# Patient Record
Sex: MALE | Race: WHITE | NOT HISPANIC OR LATINO | Employment: STUDENT | URBAN - METROPOLITAN AREA
[De-identification: names, ages, dates, MRNs, and addresses within clinical notes are randomized per-mention and may not be internally consistent; named-entity substitution may affect disease eponyms.]

---
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
## 2024-02-21 ENCOUNTER — TELEPHONE (OUTPATIENT)
Dept: FAMILY MEDICINE | Facility: CLINIC | Age: 19
End: 2024-02-21
Payer: COMMERCIAL

## 2024-02-21 NOTE — TELEPHONE ENCOUNTER
Called pt LM to Reschedule pt appointment due to Kassi SAM will be out on February 27 th. Please reschedule appointment if patient calls back.- Heena COCHRAN

## 2024-03-04 ENCOUNTER — TELEPHONE (OUTPATIENT)
Dept: FAMILY MEDICINE | Facility: CLINIC | Age: 19
End: 2024-03-04
Payer: COMMERCIAL

## 2024-03-04 NOTE — TELEPHONE ENCOUNTER
Attempted to reach pt , cell # is not the correct # . The person that answers says we have wrong # . Attempted to reach pt on hm # , it is not a working # .--lp

## 2024-03-04 NOTE — TELEPHONE ENCOUNTER
----- Message from Rochelle Renteria sent at 3/4/2024 12:55 PM CST -----  Contact: pt  Type: Needs Medical Advice  Who Called:  pt  Best Call Back Number: [phone]    Additional Information: Pt is calling the office regarding script for skin condition, pt is requesting a refill because it is coming back.Please call back and advise.

## 2024-03-13 ENCOUNTER — TELEPHONE (OUTPATIENT)
Dept: FAMILY MEDICINE | Facility: CLINIC | Age: 19
End: 2024-03-13
Payer: COMMERCIAL

## 2024-03-13 DIAGNOSIS — B36.0 TINEA VERSICOLOR: ICD-10-CM

## 2024-03-13 RX ORDER — KETOCONAZOLE 20 MG/ML
SHAMPOO, SUSPENSION TOPICAL
Qty: 120 ML | Refills: 3 | Status: CANCELLED | OUTPATIENT
Start: 2024-03-13

## 2024-03-13 RX ORDER — FLUCONAZOLE 150 MG/1
150 TABLET ORAL DAILY
Qty: 1 TABLET | Refills: 0 | Status: CANCELLED | OUTPATIENT
Start: 2024-03-13 | End: 2024-03-14

## 2024-03-13 NOTE — TELEPHONE ENCOUNTER
----- Message from Christine Pitt sent at 3/12/2024  3:29 PM CDT -----  Regarding: RX Refill Request  Contact: patient at [phone]  Type:  RX Refill Request    Who Called:  patient at [phone] (information updated, incorrect phone prior)    Preferred Pharmacy with phone number:    [id number] - [place] - [address]  [address]  [place] [address]  Phone: [phone] Fax: [phone]      Additional Information:  patient would like a refill of the medication below and the pills that were given to him, but not prescribed for the fungal area. If an appointment is need please provide as well. Please call and advise. Thank you      ketoconazole (NIZORAL) 2 % shampoo 120 mL 3 2/16/2023 -   Sig - Route: Apply topically 3 (three) times a week. - Topical (Top)   Sent to pharmacy as: ketoconazole (NIZORAL) 2 % shampoo   E-Prescribing Status: Receipt confirmed by pharmacy (2/16/2023  5:10 PM CST)

## 2024-03-15 RX ORDER — FLUCONAZOLE 150 MG/1
150 TABLET ORAL DAILY
Qty: 3 TABLET | Refills: 0 | Status: SHIPPED | OUTPATIENT
Start: 2024-03-15 | End: 2024-03-18

## 2024-03-15 NOTE — ADDENDUM NOTE
Addended by: SHAZIA HARRISON on: 3/15/2024 07:20 AM     Modules accepted: Orders    
No cyanosis, no pallor, no jaundice, no rash

## 2024-05-02 ENCOUNTER — LAB VISIT (OUTPATIENT)
Dept: LAB | Facility: HOSPITAL | Age: 19
End: 2024-05-02
Attending: FAMILY MEDICINE
Payer: COMMERCIAL

## 2024-05-02 ENCOUNTER — OFFICE VISIT (OUTPATIENT)
Dept: FAMILY MEDICINE | Facility: CLINIC | Age: 19
End: 2024-05-02
Payer: COMMERCIAL

## 2024-05-02 VITALS
HEIGHT: 64 IN | DIASTOLIC BLOOD PRESSURE: 78 MMHG | SYSTOLIC BLOOD PRESSURE: 108 MMHG | HEART RATE: 79 BPM | BODY MASS INDEX: 28.88 KG/M2 | OXYGEN SATURATION: 99 % | WEIGHT: 169.19 LBS

## 2024-05-02 DIAGNOSIS — Z00.00 WELLNESS EXAMINATION: ICD-10-CM

## 2024-05-02 DIAGNOSIS — Z11.3 SCREENING EXAMINATION FOR STI: ICD-10-CM

## 2024-05-02 DIAGNOSIS — Z00.00 WELLNESS EXAMINATION: Primary | ICD-10-CM

## 2024-05-02 LAB
ALBUMIN SERPL BCP-MCNC: 4.5 G/DL (ref 3.5–5.2)
ALP SERPL-CCNC: 64 U/L (ref 55–135)
ALT SERPL W/O P-5'-P-CCNC: 26 U/L (ref 10–44)
ANION GAP SERPL CALC-SCNC: 7 MMOL/L (ref 8–16)
AST SERPL-CCNC: 19 U/L (ref 10–40)
BASOPHILS # BLD AUTO: 0.07 K/UL (ref 0–0.2)
BASOPHILS NFR BLD: 0.9 % (ref 0–1.9)
BILIRUB SERPL-MCNC: 2.2 MG/DL (ref 0.1–1)
BUN SERPL-MCNC: 17 MG/DL (ref 6–20)
CALCIUM SERPL-MCNC: 10.4 MG/DL (ref 8.7–10.5)
CHLORIDE SERPL-SCNC: 104 MMOL/L (ref 95–110)
CHOLEST SERPL-MCNC: 162 MG/DL (ref 120–199)
CHOLEST/HDLC SERPL: 3.4 {RATIO} (ref 2–5)
CO2 SERPL-SCNC: 27 MMOL/L (ref 23–29)
CREAT SERPL-MCNC: 0.9 MG/DL (ref 0.5–1.4)
DIFFERENTIAL METHOD BLD: NORMAL
EOSINOPHIL # BLD AUTO: 0.1 K/UL (ref 0–0.5)
EOSINOPHIL NFR BLD: 1.4 % (ref 0–8)
ERYTHROCYTE [DISTWIDTH] IN BLOOD BY AUTOMATED COUNT: 12.5 % (ref 11.5–14.5)
EST. GFR  (NO RACE VARIABLE): >60 ML/MIN/1.73 M^2
ESTIMATED AVG GLUCOSE: 100 MG/DL (ref 68–131)
GLUCOSE SERPL-MCNC: 92 MG/DL (ref 70–110)
HBA1C MFR BLD: 5.1 % (ref 4–5.6)
HCT VFR BLD AUTO: 46 % (ref 40–54)
HDLC SERPL-MCNC: 48 MG/DL (ref 40–75)
HDLC SERPL: 29.6 % (ref 20–50)
HGB BLD-MCNC: 15.5 G/DL (ref 14–18)
IMM GRANULOCYTES # BLD AUTO: 0.03 K/UL (ref 0–0.04)
IMM GRANULOCYTES NFR BLD AUTO: 0.4 % (ref 0–0.5)
LDLC SERPL CALC-MCNC: 93.6 MG/DL (ref 63–159)
LYMPHOCYTES # BLD AUTO: 3.1 K/UL (ref 1–4.8)
LYMPHOCYTES NFR BLD: 38.2 % (ref 18–48)
MCH RBC QN AUTO: 29.5 PG (ref 27–31)
MCHC RBC AUTO-ENTMCNC: 33.7 G/DL (ref 32–36)
MCV RBC AUTO: 88 FL (ref 82–98)
MONOCYTES # BLD AUTO: 0.7 K/UL (ref 0.3–1)
MONOCYTES NFR BLD: 8.1 % (ref 4–15)
NEUTROPHILS # BLD AUTO: 4.1 K/UL (ref 1.8–7.7)
NEUTROPHILS NFR BLD: 51 % (ref 38–73)
NONHDLC SERPL-MCNC: 114 MG/DL
NRBC BLD-RTO: 0 /100 WBC
PLATELET # BLD AUTO: 279 K/UL (ref 150–450)
PMV BLD AUTO: 9.6 FL (ref 9.2–12.9)
POTASSIUM SERPL-SCNC: 4.3 MMOL/L (ref 3.5–5.1)
PROT SERPL-MCNC: 7 G/DL (ref 6–8.4)
RBC # BLD AUTO: 5.25 M/UL (ref 4.6–6.2)
SODIUM SERPL-SCNC: 138 MMOL/L (ref 136–145)
TREPONEMA PALLIDUM IGG+IGM AB [PRESENCE] IN SERUM OR PLASMA BY IMMUNOASSAY: NONREACTIVE
TRIGL SERPL-MCNC: 102 MG/DL (ref 30–150)
WBC # BLD AUTO: 7.99 K/UL (ref 3.9–12.7)

## 2024-05-02 PROCEDURE — 86696 HERPES SIMPLEX TYPE 2 TEST: CPT | Performed by: FAMILY MEDICINE

## 2024-05-02 PROCEDURE — 87340 HEPATITIS B SURFACE AG IA: CPT | Performed by: FAMILY MEDICINE

## 2024-05-02 PROCEDURE — 80061 LIPID PANEL: CPT | Performed by: FAMILY MEDICINE

## 2024-05-02 PROCEDURE — 85025 COMPLETE CBC W/AUTO DIFF WBC: CPT | Performed by: FAMILY MEDICINE

## 2024-05-02 PROCEDURE — 3008F BODY MASS INDEX DOCD: CPT | Mod: CPTII,S$GLB,, | Performed by: FAMILY MEDICINE

## 2024-05-02 PROCEDURE — 83036 HEMOGLOBIN GLYCOSYLATED A1C: CPT | Performed by: FAMILY MEDICINE

## 2024-05-02 PROCEDURE — 99395 PREV VISIT EST AGE 18-39: CPT | Mod: S$GLB,,, | Performed by: FAMILY MEDICINE

## 2024-05-02 PROCEDURE — 87389 HIV-1 AG W/HIV-1&-2 AB AG IA: CPT | Performed by: FAMILY MEDICINE

## 2024-05-02 PROCEDURE — 3078F DIAST BP <80 MM HG: CPT | Mod: CPTII,S$GLB,, | Performed by: FAMILY MEDICINE

## 2024-05-02 PROCEDURE — 99999 PR PBB SHADOW E&M-EST. PATIENT-LVL III: CPT | Mod: PBBFAC,,, | Performed by: FAMILY MEDICINE

## 2024-05-02 PROCEDURE — 86704 HEP B CORE ANTIBODY TOTAL: CPT | Performed by: FAMILY MEDICINE

## 2024-05-02 PROCEDURE — 36415 COLL VENOUS BLD VENIPUNCTURE: CPT | Mod: PN | Performed by: FAMILY MEDICINE

## 2024-05-02 PROCEDURE — 80053 COMPREHEN METABOLIC PANEL: CPT | Performed by: FAMILY MEDICINE

## 2024-05-02 PROCEDURE — 1160F RVW MEDS BY RX/DR IN RCRD: CPT | Mod: CPTII,S$GLB,, | Performed by: FAMILY MEDICINE

## 2024-05-02 PROCEDURE — 3074F SYST BP LT 130 MM HG: CPT | Mod: CPTII,S$GLB,, | Performed by: FAMILY MEDICINE

## 2024-05-02 PROCEDURE — 86803 HEPATITIS C AB TEST: CPT | Performed by: FAMILY MEDICINE

## 2024-05-02 PROCEDURE — 86593 SYPHILIS TEST NON-TREP QUANT: CPT | Performed by: FAMILY MEDICINE

## 2024-05-02 PROCEDURE — 1159F MED LIST DOCD IN RCRD: CPT | Mod: CPTII,S$GLB,, | Performed by: FAMILY MEDICINE

## 2024-05-02 PROCEDURE — 86706 HEP B SURFACE ANTIBODY: CPT | Mod: 91 | Performed by: FAMILY MEDICINE

## 2024-05-02 NOTE — PROGRESS NOTES
THIS DOCUMENT WAS MADE IN PART WITH VOICE RECOGNITION SOFTWARE.  OCCASIONALLY THIS SOFTWARE WILL MISINTERPRET WORDS OR PHRASES.    Assessment and Plan:    1. Wellness examination  CBC Auto Differential    Comprehensive Metabolic Panel    Lipid Panel    Hemoglobin A1C    Urinalysis    Urinalysis Microscopic      2. Screening examination for STI  HERPES SIMPLEX 1&2 IGG    Hepatitis B Core Antibody, Total    Hepatitis B Surface Ab, Qualitative    Hepatitis B Surface Antigen    Hepatitis C Antibody    HIV 1/2 Ag/Ab (4th Gen)    C. trachomatis/N. gonorrhoeae by AMP DNA [facility]; Urine    Treponema Pallidium Antibodies IgG, IgM          STI screen, Wellness labs as above     Chart updated     Anticipatory guidance given     Follow-up yearly    ______________________________________________________________________  Subjective:    Chief Complaint:  Chief Complaint   Patient presents with    Follow-up     Skin condition check up        HPI:  Keyur is a 18 y.o. year old     Patient presents today for annual wellness exam     Exposed to herpes, girlfriend had diagnosis   Patient denies any symptoms   Request STI screen    Tinea versicolor   Rx-Diflucan, ketoconazole shampoo          Past Medical History:  Past Medical History:   Diagnosis Date    Tinea versicolor        Past Surgical History:  No past surgical history on file.    Family History:  Family History   Problem Relation Name Age of Onset    No Known Problems Mother      No Known Problems Father      No Known Problems Brother donna     Ovarian cancer Maternal Grandmother      Diabetes type II Paternal Grandmother      Heart attacks under age 50 Paternal Grandfather  73       Social History:  Social History     Socioeconomic History    Marital status: Single   Tobacco Use    Smoking status: Never     Passive exposure: Yes    Smokeless tobacco: Never   Substance and Sexual Activity    Alcohol use: Not Currently    Drug use: Not Currently    Sexual activity: Not Currently  "    Birth control/protection: Condom   Social History Narrative    [employer] : [employer]     Graduated [employer]     Exercise : [employer]      Diet : Normal        Medications:  Current Outpatient Medications on File Prior to Visit   Medication Sig Dispense Refill    ketoconazole (NIZORAL) 2 % shampoo Apply topically 3 (three) times a week. 120 mL 3     No current facility-administered medications on file prior to visit.       Allergies:  Patient has no known allergies.    Immunizations:  Immunization History   Administered Date(s) Administered    DTaP 2004, 2004, 04/13/2005, 03/09/2006    DTaP / IPV 07/30/2009    HPV 9-Valent 07/08/2015, 09/14/2015    Hep B / HiB 2004, 2004, 07/06/2005    Hepatitis B, Pediatric/Adolescent 2004    IPV 2004, 2004, 03/09/2006    Influenza 12/16/2005    Influenza - Quadrivalent - PF *Preferred* (6 months and older) 10/22/2020    Influenza - Trivalent - PF (PED) 12/16/2005    MMR 12/10/2005, 07/30/2009    Meningococcal B, OMV 10/22/2020, 11/24/2020    Meningococcal Conjugate 10/22/2020    Meningococcal Conjugate (MCV4O) 1 Vial Dose(10yr-55yr) 10/22/2020    Meningococcal Conjugate (MCV4O) 2 Vial (2mo-55yr) 07/08/2015    Pneumococcal Conjugate - 7 Valent 2004, 2004, 01/17/2005, 12/16/2005    Tdap 07/08/2015, 11/24/2020    Varicella 07/06/2005, 07/30/2009       Review of Systems:  Review of Systems   All other systems reviewed and are negative.      Objective:    Vitals:  Vitals:    05/02/24 1443   BP: 108/78   Pulse: 79   SpO2: 99%   Weight: 76.8 kg (169 lb 3.3 oz)   Height: 5' 4" (1.626 m)   PainSc: 0-No pain       Physical Exam  Vitals reviewed.   Constitutional:       General: He is not in acute distress.  HENT:      Head: Normocephalic and atraumatic.   Eyes:      Pupils: Pupils are equal, round, and reactive to light.   Cardiovascular:      Rate and Rhythm: Normal rate and regular rhythm.      Heart sounds: No murmur heard.     No " friction rub.   Pulmonary:      Effort: Pulmonary effort is normal.      Breath sounds: Normal breath sounds.   Abdominal:      General: Bowel sounds are normal. There is no distension.      Palpations: Abdomen is soft.      Tenderness: There is no abdominal tenderness.   Musculoskeletal:      Cervical back: Neck supple.   Skin:     General: Skin is warm and dry.      Findings: No rash.   Psychiatric:         Behavior: Behavior normal.           Baudilio Conti MD  Family Medicine

## 2024-05-03 LAB
HBV CORE AB SERPL QL IA: NORMAL
HBV SURFACE AB SER-ACNC: 5.32 MIU/ML
HBV SURFACE AB SER-ACNC: NORMAL M[IU]/ML
HBV SURFACE AG SERPL QL IA: NORMAL
HCV AB SERPL QL IA: NORMAL
HIV 1+2 AB+HIV1 P24 AG SERPL QL IA: NORMAL
HSV1 IGG SERPL QL IA: NEGATIVE
HSV2 IGG SERPL QL IA: NEGATIVE

## 2025-04-04 ENCOUNTER — OFFICE VISIT (OUTPATIENT)
Dept: FAMILY MEDICINE | Facility: CLINIC | Age: 20
End: 2025-04-04
Payer: COMMERCIAL

## 2025-04-04 VITALS
OXYGEN SATURATION: 99 % | HEART RATE: 74 BPM | WEIGHT: 176.56 LBS | SYSTOLIC BLOOD PRESSURE: 122 MMHG | DIASTOLIC BLOOD PRESSURE: 80 MMHG | BODY MASS INDEX: 30.14 KG/M2 | HEIGHT: 64 IN

## 2025-04-04 DIAGNOSIS — Z00.00 WELLNESS EXAMINATION: ICD-10-CM

## 2025-04-04 DIAGNOSIS — B36.0 TINEA VERSICOLOR: ICD-10-CM

## 2025-04-04 PROCEDURE — 99999 PR PBB SHADOW E&M-EST. PATIENT-LVL III: CPT | Mod: PBBFAC,,,

## 2025-04-04 RX ORDER — FLUCONAZOLE 150 MG/1
300 TABLET ORAL WEEKLY
Qty: 4 TABLET | Refills: 0 | Status: SHIPPED | OUTPATIENT
Start: 2025-04-04 | End: 2025-04-12

## 2025-04-04 RX ORDER — KETOCONAZOLE 20 MG/ML
SHAMPOO, SUSPENSION TOPICAL
Qty: 120 ML | Refills: 3 | Status: CANCELLED | OUTPATIENT
Start: 2025-04-04

## 2025-04-04 RX ORDER — KETOCONAZOLE 20 MG/ML
SHAMPOO, SUSPENSION TOPICAL
Qty: 120 ML | Refills: 3 | Status: SHIPPED | OUTPATIENT
Start: 2025-04-04

## 2025-04-04 NOTE — PROGRESS NOTES
[facility]  [address] [place] [address]    Subjective    Chief Complaint:   Chief Complaint   Patient presents with    Follow-up     Skin spots coming back on chest and groin area       History of Present Illness:     Keyur Connelly is a(n) 19 y.o. male with past medical history as noted below who presents to the clinic today for rash.    Patient with recurrent rash to chest and groin. He has a history tinea versicolor in these areas that have required systemic antifungal therapy with weekly fluconazole. He describes the rash as dark or red circles that do not hurt or itch and seem to become more prominent in summertime. With sun exposure they seem to turn white.     He is otherwise doing well. He is attending [employer] and graduating in 2026. No stress/depression. Working at a local restaurant.      Tinea versicolor   Rx-Diflucan, ketoconazole shampoo    Problem List: Problem List[1]    Current Outpatient Medications:   Current Outpatient Medications   Medication Instructions    ketoconazole (NIZORAL) 2 % shampoo Topical (Top), Three times weekly       Surgical History: No past surgical history on file.    Family History:   Family History   Problem Relation Name Age of Onset    No Known Problems Mother      No Known Problems Father      No Known Problems Brother donna     Ovarian cancer Maternal Grandmother      Diabetes type II Paternal Grandmother      Heart attacks under age 50 Paternal Grandfather  73       Allergies: Review of patient's allergies indicates:  No Known Allergies    Tobacco Status:   Tobacco Use: Medium Risk (4/4/2025)    Patient History     Smoking Tobacco Use: Never     Smokeless Tobacco Use: Never     Passive Exposure: Yes       Sexual Activity:   Social History     Substance and Sexual Activity   Sexual Activity Not Currently    Birth control/protection: Condom       Alcohol Use:   Social History     Substance and Sexual  "Activity   Alcohol Use Not Currently         Objective       Vitals:    04/04/25 0916   BP: 122/80   Patient Position: Sitting   Pulse: 74   SpO2: 99%   Weight: 80.1 kg (176 lb 9.4 oz)   Height: 5' 4" (1.626 m)       Review of Systems   HENT:  Negative for hearing loss.    Eyes:  Negative for discharge.   Respiratory:  Negative for wheezing.    Cardiovascular:  Negative for chest pain and palpitations.   Gastrointestinal:  Negative for blood in stool, constipation, diarrhea and vomiting.   Genitourinary:  Negative for hematuria and urgency.   Musculoskeletal:  Negative for neck pain.   Skin:  Positive for rash.   Neurological:  Negative for weakness and headaches.   Endo/Heme/Allergies:  Negative for polydipsia.       Physical Exam  Constitutional:       General: He is not in acute distress.     Appearance: Normal appearance.   HENT:      Head: Normocephalic and atraumatic.   Pulmonary:      Effort: Pulmonary effort is normal. No respiratory distress.   Skin:     General: Skin is warm.      Findings: Rash (well circumscribed scattered red-brown macules to chest and upper thighs) present.   Neurological:      Mental Status: He is alert and oriented to person, place, and time.   Psychiatric:         Behavior: Behavior normal.           Assessment and Plan:    1. Tinea versicolor  -     ketoconazole (NIZORAL) 2 % shampoo; Apply topically 3 (three) times a week.  Dispense: 120 mL; Refill: 3  -     fluconazole (DIFLUCAN) 150 MG Tab; Take 2 tablets (300 mg total) by mouth once a week. for 2 doses  Dispense: 4 tablet; Refill: 0    2. Wellness examination  -     CBC Auto Differential; Future; Expected date: 04/04/2025  -     Comprehensive Metabolic Panel; Future; Expected date: 04/04/2025  -     Lipid Panel; Future; Expected date: 04/04/2025  -     Hemoglobin A1C; Future; Expected date: 04/04/2025  -     TSH; Future; Expected date: 04/04/2025  -     T4, Free; Future; Expected date: 04/04/2025        Visit summary:    Keyur" Godwin presented today for rash.    Prescribed ketoconazole shampoo to try first x several days-weeks, if no resolution can take diflucan as well    Routine labs as above     Follow up: annually with PCP      Kassi Rivera PA-C    This note was created partially with voice dictation software and is prone to errors. This note has been reviewed by me but some errors are inevitable.          [1]   Patient Active Problem List  Diagnosis    Syncope